# Patient Record
Sex: FEMALE | Race: WHITE | NOT HISPANIC OR LATINO | URBAN - METROPOLITAN AREA
[De-identification: names, ages, dates, MRNs, and addresses within clinical notes are randomized per-mention and may not be internally consistent; named-entity substitution may affect disease eponyms.]

---

## 2025-06-26 ENCOUNTER — OFFICE VISIT (OUTPATIENT)
Dept: URGENT CARE | Age: 85
End: 2025-06-26
Payer: MEDICARE

## 2025-06-26 VITALS
DIASTOLIC BLOOD PRESSURE: 64 MMHG | SYSTOLIC BLOOD PRESSURE: 144 MMHG | OXYGEN SATURATION: 95 % | TEMPERATURE: 98 F | HEART RATE: 67 BPM | RESPIRATION RATE: 18 BRPM

## 2025-06-26 DIAGNOSIS — J02.9 SORE THROAT: Primary | ICD-10-CM

## 2025-06-26 DIAGNOSIS — B37.0 ORAL THRUSH: ICD-10-CM

## 2025-06-26 LAB
POC HUMAN RHINOVIRUS PCR: NEGATIVE
POC INFLUENZA A VIRUS PCR: NEGATIVE
POC INFLUENZA B VIRUS PCR: NEGATIVE
POC RESPIRATORY SYNCYTIAL VIRUS PCR: NEGATIVE
POC STREPTOCOCCUS PYOGENES (GROUP A STREP) PCR: NEGATIVE

## 2025-06-26 RX ORDER — NYSTATIN 100000 [USP'U]/ML
5 SUSPENSION ORAL 4 TIMES DAILY
Qty: 200 ML | Refills: 0 | Status: SHIPPED | OUTPATIENT
Start: 2025-06-26 | End: 2025-07-06

## 2025-06-26 ASSESSMENT — ENCOUNTER SYMPTOMS: SORE THROAT: 1

## 2025-06-26 NOTE — PATIENT INSTRUCTIONS
Nystatin swish and swallow 4 times a day for 10 days.  Please call your primary care doctor next 5 to 7 days.  If worsening symptoms, please go to local emergency department for further evaluation.    Please follow up with your primary provider within one week if symptoms do not improve.  You may schedule an appointment online at [email or web address] or call [phone]. Go to the Emergency Department if symptoms significantly worsen or if you develop chest pain or shortness of breath.

## 2025-06-26 NOTE — PROGRESS NOTES
"Subjective   Patient ID: Elba Corrigan is a 85 y.o. female. They present today with a chief complaint of Throat Problem (Doesn't hurt - says it feels weird for a few weeks ).    History of Present Illness  Elba Corrigan is a 85 y.o. female who presents to the clinic for 1 to 2 weeks of throat discomfort and irritation.  Patient denies any pain to the throat.  Patient denies any issues with eating or drinking.  Patient denies any changes to her voice.  Patient denies any fevers, chills or bodyaches.  Patient states her \"throat just does not feel right.\" Pt denies any chest pain, sob, N/V at this time in clinic.             Past Medical History  Allergies as of 06/26/2025    (No Known Allergies)       Prescriptions Prior to Admission[1]     Medical History[2]    Surgical History[3]     reports that she has never smoked. She has never used smokeless tobacco.    Review of Systems  Review of Systems   HENT:  Positive for sore throat.    All other systems reviewed and are negative.                                 Objective    Vitals:    06/26/25 1227   BP: 144/64   Pulse: 67   Resp: 18   Temp: 36.7 °C (98 °F)   SpO2: 95%     No LMP recorded.    Physical Exam  Constitutional:       Appearance: Normal appearance.   HENT:      Right Ear: Tympanic membrane normal.      Left Ear: Tympanic membrane normal.      Nose:      Right Sinus: No maxillary sinus tenderness or frontal sinus tenderness.      Left Sinus: No maxillary sinus tenderness or frontal sinus tenderness.      Mouth/Throat:      Pharynx: Posterior oropharyngeal erythema present.      Tonsils: No tonsillar exudate.        Comments: White coating noted to patient's tongue  After scraping an area of white coating of the tongue, an area of erythema noted.   Neurological:      General: No focal deficit present.      Mental Status: She is alert and oriented to person, place, and time. Mental status is at baseline.   Psychiatric:         Mood and Affect: Mood " normal.         Behavior: Behavior normal.         Procedures    Point of Care Test & Imaging Results from this visit  Results for orders placed or performed in visit on 06/26/25   POCT SPOTFIRE R/ST Panel Mini w/Strep A ([facility]) manually resulted   Result Value Ref Range    POC Group A Strep, PCR Negative Negative    POC Respiratory Syncytial Virus PCR Negative Negative    POC Influenza A Virus PCR Negative Negative    POC Influenza B Virus PCR Negative Negative    POC Human Rhinovirus PCR Negative Negative      Imaging  No results found.    Cardiology, Vascular, and Other Imaging  No other imaging results found for the past 2 days      Diagnostic study results (if any) were reviewed by RAS Ivan.    Assessment/Plan   Allergies, medications, history, and pertinent labs/EKGs/Imaging reviewed by RAS Ivan.     Medical Decision Making  Oral thrush noted to the posterior tongue.   Negative spot fire for strep, flu AB, rhinovirus, RSV.  Scrape test reveals erythema under the white coating on the tongue.  Will treat patient with nystatin swish and swallow 4 times a day for 10 days.  Advised patient to follow-up with her primary care doctor next 5 to 7 days.  Advised patient if worsening symptoms to go to local emergency department for further evaluation.  Patient verbalized understanding      Orders and Diagnoses  Diagnoses and all orders for this visit:  Sore throat  -     POCT SPOTFIRE R/ST Panel Mini w/Strep A ([facility]) manually resulted  Oral thrush  -     nystatin (Mycostatin) 100,000 unit/mL suspension; Take 5 mL (500,000 Units) by mouth 4 times a day for 10 days.      Medical Admin Record      Patient disposition: Home    Electronically signed by GENEVA Ivan-CNP  1:26 PM           [1] (Not in a hospital admission)   [2] No past medical history on file.  [3] No past surgical history on file.